# Patient Record
Sex: FEMALE | Race: WHITE | NOT HISPANIC OR LATINO | Employment: FULL TIME | URBAN - METROPOLITAN AREA
[De-identification: names, ages, dates, MRNs, and addresses within clinical notes are randomized per-mention and may not be internally consistent; named-entity substitution may affect disease eponyms.]

---

## 2021-03-03 ENCOUNTER — OFFICE VISIT (OUTPATIENT)
Dept: DERMATOLOGY | Facility: CLINIC | Age: 46
End: 2021-03-03
Payer: COMMERCIAL

## 2021-03-03 VITALS — TEMPERATURE: 98.2 F | WEIGHT: 115 LBS | BODY MASS INDEX: 21.16 KG/M2 | HEIGHT: 62 IN

## 2021-03-03 DIAGNOSIS — L71.9 ROSACEA: Primary | ICD-10-CM

## 2021-03-03 DIAGNOSIS — L66.1 LICHEN PLANO-PILARIS: ICD-10-CM

## 2021-03-03 PROCEDURE — 99204 OFFICE O/P NEW MOD 45 MIN: CPT | Performed by: STUDENT IN AN ORGANIZED HEALTH CARE EDUCATION/TRAINING PROGRAM

## 2021-03-03 RX ORDER — TRAZODONE HYDROCHLORIDE 50 MG/1
1 TABLET ORAL AS NEEDED
COMMUNITY
Start: 2021-01-28

## 2021-03-03 RX ORDER — HYDROXYCHLOROQUINE SULFATE 200 MG/1
TABLET, FILM COATED ORAL
Qty: 30 TABLET | Refills: 5 | Status: SHIPPED | OUTPATIENT
Start: 2021-03-03 | End: 2022-03-03

## 2021-03-03 RX ORDER — DEXTROAMPHETAMINE SACCHARATE, AMPHETAMINE ASPARTATE, DEXTROAMPHETAMINE SULFATE AND AMPHETAMINE SULFATE 2.5; 2.5; 2.5; 2.5 MG/1; MG/1; MG/1; MG/1
10 TABLET ORAL
COMMUNITY
Start: 2021-02-11 | End: 2021-05-12

## 2021-03-03 RX ORDER — TACROLIMUS 1 MG/G
OINTMENT TOPICAL 2 TIMES DAILY
Qty: 100 G | Refills: 3 | Status: SHIPPED | OUTPATIENT
Start: 2021-03-03

## 2021-03-03 RX ORDER — DOXYCYCLINE HYCLATE 100 MG/1
100 CAPSULE ORAL EVERY 12 HOURS SCHEDULED
Qty: 60 CAPSULE | Refills: 2 | Status: SHIPPED | OUTPATIENT
Start: 2021-03-03 | End: 2021-05-25

## 2021-03-03 NOTE — PATIENT INSTRUCTIONS
1  ROSACEA (POSSIBLE COMPONENT OF OCULAR)    Assessment and Plan:  Based on a thorough discussion of this condition and the management approach to it (including a comprehensive discussion of the known risks, side effects and potential benefits of treatment), the patient (family) agrees to implement the following specific plan:   Apply sun screen daily SPF 30 or higher   Please continue to use Metronidazole 0 75% cream   Please start Doxycycline 100 mg twice daily for 3 months    Please use Protopic 0 1% ointment twice daily as needed    Please start Plaquenil 200 mg take 1 tablet twice daily-Please continue routine eye exams  Rosacea is a chronic rash affecting the mid-face including the nose, cheeks, chin, forehead, and eyelids  The incidence is usually greatest between the ages of 30-60 years and is more common in people with fair skin  Common characteristics include redness, telangiectasias, papules and pustules over affected areas  Rosacea may look similar to acne, but there is a lack of comedones  Occasionally the eyes may also be involved in ocular rosacea  In advanced disease, enlargement of the sebaceous glands in the nose, termed rhinophyma, may be present  Rosacea results in red spots (papules) and sometimes pustules over the face, but unlike acne there are no blackheads, whiteheads, or cystic nodules  Patients often experience increased facial flushing with prominent blood vessels (erythematotelangiectatic rosacea) and dry, sensitive skin  These symptoms are exacerbated by sun exposure, hot or spicy foods, topical steroids and oil-based facial products  In ocular rosacea, eyelids may be red and sore due to conjunctivitis, keratitis, and episcleritis  If rhinophyma develops due to enlargement of sebaceous glands, the patient may have an enlarged and irregularly shaped nose with prominent pores   In rosacea that is refractory to treatment, patients can develop persistent redness and swelling of the face due to lymphatic obstruction (Morbihan disease)  Distribution around the cheeks may be confused with the malar or butterfly rash of lupus  However, the rash of lupus spares the nasal creases and lacks papules and pustules  If signs of photosensitivity, oral ulcers, arthritis, and kidney dysfunction are present then consider referral to a rheumatologist      There are many potential causes of rosacea including genetic, environmental, vascular, and inflammatory factors   These include, but are not limited to:   Chronic exposure to ultraviolet radiation    Increased immune responses in the form of cathelicidins that promote vessel dilation and infiltration with white blood cells (neutrophils) into the dermis   Increased matrix metalloproteinases such as collagen and elastase that remodel normal tissue may contribute to inflammation of the skin making it thicker and harder   There is some evidence to suggest that increased numbers of demodex mites on patient skin may contribute to rosacea papules     General Treatment Approach    Avoid exacerbating factors such as heat, spicy foods, and alcohol    Use daily SPF30+ sunscreen and other methods of coverage for sun protection   Use water-based make-up    Avoid applying topical steroids to affected areas as they can cause perioral dermatitis and exacerbate rosacea     Topical Treatment Approach   Metronidazole cream or gel by itself or in combination with oral antibiotics for more severe cases   Azelaic acid cream or lotion is effective for mild inflammatory rosacea when applied twice daily to affected areas   Brimonidine gel and oxymetazoline hydrochloride cream can reduce facial redness temporarily    Ivermectin cream can treat papulopustular rosacea by controlling demodex mites and inflammation    Pimecrolimus cream or tacrolimus ointment twice a day for 2-3 months can help reduce inflammation    Oral Treatment Approach   Antibiotics such as doxycycline, minocycline, or erythromycin for 1-3 months   Clonidine and carvedilol can help reduce facial flushing and are generally well tolerated  Common side effects include low blood pressure, gastrointestinal upset, dry eyes, blurred vision and low heart rate   Isotretinoin at low doses can be effective for long term treatment when antibiotics fail  Side effects may make it unsuitable for some patients   NSAIDs such as diclofenac can help reduce discomfort and redness in the skin  Procedural/Surgical Treatment Approach    Vascular lasers or intense pulsed light treatment may be used to treat persistent telangiectasia and papulopustular rosacea   Plastic surgery and carbon dioxide lasers may be used to treat rhinophyma     2   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

## 2021-03-03 NOTE — PROGRESS NOTES
[facility] Note     Patient Name: Xochitl Snowden  Encounter Date: 03/03/2021     Have you been cared for by a [facility] in the last 3 years and, if so, which one? No    · Have you traveled outside of the [address] [place] [address] in the past 3 months or outside of the [facility] area in the last 2 weeks? No     May we call your Preferred Phone number to discuss your specific medical information? Yes     May we leave a detailed message that includes your specific medical information? Yes      Today's Chief Concerns:   Concern #1:  Rosacea     Past Medical History:  Have you personally ever had or currently have any of the following? · Skin cancer (such as Melanoma, Basal Cell Carcinoma, Squamous Cell Carcinoma? (If Yes, please provide more detail)- No  · Eczema: No  · Psoriasis: No  · HIV/AIDS: No  · Hepatitis B or C: No  · Tuberculosis: No  · Systemic Immunosuppression such as Diabetes, Biologic or Immunotherapy, Chemotherapy, Organ Transplantation, Bone Marrow Transplantation (If YES, please provide more detail): No  · Radiation Treatment (If YES, please provide more detail): No  · Any other major medical conditions/concerns? 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 Golf    Family History:  Have any of your "first degree relatives" (parent, brother, sister, or child) had any of the following       · Skin cancer such as Melanoma or Merkel Cell Carcinoma or Pancreatic Cancer? No  · Eczema, Asthma, Hay Fever or Seasonal Allergies: YES, Eczema   · Psoriasis or Psoriatic Arthritis: No  · Do any other medical conditions seem to run in your family? If Yes, what condition and which relatives?   No    Current Medications:         Current Outpatient Medications:     amphetamine-dextroamphetamine (ADDERALL) 10 mg tablet, Take 10 mg by mouth, Disp: , Rfl:     traZODone (DESYREL) 50 mg tablet, Take 1 tablet by mouth as needed, Disp: , Rfl:       Review of Systems:  Have you recently had or currently have any of the following? If YES, what are you doing for the problem? · Fever, chills or unintended weight loss: YES, Weight loss  · Sudden loss or change in your vision: No  · Nausea, vomiting or blood in your stool: No  · Painful or swollen joints: NO  · Wheezing or cough: No  · Changing mole or non-healing wound: No  · Nosebleeds: No  · Excessive sweating: No  · Easy or prolonged bleeding? No  · Over the last 2 weeks, how often have you been bothered by the following problems? · Taking little interest or pleasure in doing things: 1 - Not at All  · Feeling down, depressed, or hopeless: 1 - Not at All  · Rapid heartbeat with epinephrine:  No    · FEMALES ONLY:    · Are you pregnant or planning to become pregnant? No  · Are you currently or planning to be nursing or breast feeding? No    · Any known allergies? · No Known Allergies      Physical Exam:     Was a chaperone (Derm Clinical Assistant) present throughout the entire Physical Exam? Yes     Did the Dermatology Team specifically  the patient on the importance of a Full Skin Exam to be sure that nothing is missed clinically?  Yes}  o Did the patient ultimately request or accept a Full Skin Exam?  NO    CONSTITUTIONAL:   Vitals:    03/03/21 0800   Temp: 98 2 °F (36 8 °C)   TempSrc: Tympanic   Weight: 52 2 kg (115 lb)   Height: 5' 2" (1 575 m)         PSYCH: Normal mood and affect  EYES: Normal conjunctiva  ENT: Normal lips and oral mucosa  CARDIOVASCULAR: No edema  RESPIRATORY: Normal respirations  HEME/LYMPH/IMMUNO:  No ostensible subQ swelling y except as noted below in "ASSESSMENT AND PLAN BY DIAGNOSIS"    SKIN:  FULL ORGAN SYSTEM EXAM  Hair, Scalp,  Face Normal except as noted below in Assessment   Neck, Cervical Chain Nodes Normal except as noted below in Assessment                                        Assessment and Plan by Diagnosis:    History of Present Condition:     Duration:  How long has this been an issue for you?    o  20+ years   Location Affected:  Where on the body is this affecting you?    o  Scalp and face   Quality:  Is there any bleeding, pain, itch, burning/irritation, or redness associated with the skin lesion? o  redness   Severity:  Describe any bleeding, pain, itch, burning/irritation, or redness on a scale of 1 to 10 (with 10 being the worst)  o  10   Timing:  Does this condition seem to be there pretty constantly or do you notice it more at specific times throughout the day?    o  Constant   Context:  Have you ever noticed that this condition seems to be associated with specific activities you do?    o  Denies   Modifying Factors:    o Anything that seems to make the condition worse?    -  Denies  o What have you tried to do to make the condition better? -  Metronidazole 0 75% Cream    Associated Signs and Symptoms:  Does this skin lesion seem to be associated with any of the following:  o  SL AMB DERM SIGNS AND SYMPTOMS: Redness     1  ROSACEA (POSSIBLE COMPONENT OF OCULAR ROSACEA)    Physical Exam:   Anatomic Location Affected: Face   Morphological Description:  Slight redness on face    Pertinent Positives: N/A   Pertinent Negatives: N/A    Additional History of Present Condition: Located on face and front of scalp  Presents for 20+ years  Patient has tried Metronidazole 0 75% cream with no improvement  Assessment and Plan:  Based on a thorough discussion of this condition and the management approach to it (including a comprehensive discussion of the known risks, side effects and potential benefits of treatment), the patient (family) agrees to implement the following specific plan:   Apply sun screen daily SPF 30 or higher   Please continue to use Metronidazole 0 75% cream   Please start Doxycycline 100 mg twice daily with food for 3 months   CANNOT GET PREGNANT   Please use Protopic 0 1% ointment twice daily as needed   Please see ophthalmologist regarding eye exam for rosacea  Pt has appt relatively soon      Follow up in 3 months     Rosacea is a chronic rash affecting the mid-face including the nose, cheeks, chin, forehead, and eyelids  The incidence is usually greatest between the ages of 30-60 years and is more common in people with fair skin  Common characteristics include redness, telangiectasias, papules and pustules over affected areas  Rosacea may look similar to acne, but there is a lack of comedones  Occasionally the eyes may also be involved in ocular rosacea  In advanced disease, enlargement of the sebaceous glands in the nose, termed rhinophyma, may be present  Rosacea results in red spots (papules) and sometimes pustules over the face, but unlike acne there are no blackheads, whiteheads, or cystic nodules  Patients often experience increased facial flushing with prominent blood vessels (erythematotelangiectatic rosacea) and dry, sensitive skin  These symptoms are exacerbated by sun exposure, hot or spicy foods, topical steroids and oil-based facial products  In ocular rosacea, eyelids may be red and sore due to conjunctivitis, keratitis, and episcleritis  If rhinophyma develops due to enlargement of sebaceous glands, the patient may have an enlarged and irregularly shaped nose with prominent pores  In rosacea that is refractory to treatment, patients can develop persistent redness and swelling of the face due to lymphatic obstruction (Morbihan disease)  Distribution around the cheeks may be confused with the malar or butterfly rash of lupus  However, the rash of lupus spares the nasal creases and lacks papules and pustules   If signs of photosensitivity, oral ulcers, arthritis, and kidney dysfunction are present then consider referral to a rheumatologist      There are many potential causes of rosacea including genetic, environmental, vascular, and inflammatory factors  These include, but are not limited to:   Chronic exposure to ultraviolet radiation    Increased immune responses in the form of cathelicidins that promote vessel dilation and infiltration with white blood cells (neutrophils) into the dermis   Increased matrix metalloproteinases such as collagen and elastase that remodel normal tissue may contribute to inflammation of the skin making it thicker and harder   There is some evidence to suggest that increased numbers of demodex mites on patient skin may contribute to rosacea papules     General Treatment Approach    Avoid exacerbating factors such as heat, spicy foods, and alcohol    Use daily SPF30+ sunscreen and other methods of coverage for sun protection   Use water-based make-up    Avoid applying topical steroids to affected areas as they can cause perioral dermatitis and exacerbate rosacea     Topical Treatment Approach   Metronidazole cream or gel by itself or in combination with oral antibiotics for more severe cases   Azelaic acid cream or lotion is effective for mild inflammatory rosacea when applied twice daily to affected areas   Brimonidine gel and oxymetazoline hydrochloride cream can reduce facial redness temporarily    Ivermectin cream can treat papulopustular rosacea by controlling demodex mites and inflammation    Pimecrolimus cream or tacrolimus ointment twice a day for 2-3 months can help reduce inflammation    Oral Treatment Approach   Antibiotics such as doxycycline, minocycline, or erythromycin for 1-3 months   Clonidine and carvedilol can help reduce facial flushing and are generally well tolerated  Common side effects include low blood pressure, gastrointestinal upset, dry eyes, blurred vision and low heart rate   Isotretinoin at low doses can be effective for long term treatment when antibiotics fail  Side effects may make it unsuitable for some patients      NSAIDs such as diclofenac can help reduce discomfort and redness in the skin  Procedural/Surgical Treatment Approach    Vascular lasers or intense pulsed light treatment may be used to treat persistent telangiectasia and papulopustular rosacea   Plastic surgery and carbon dioxide lasers may be used to treat rhinophyma     2  LIKELY LICHEN PLANO-PILARIS    Physical Exam:   Anatomic Location Affected:  Scalp and eye brows   Morphological Description:  Loss of hair on eyebrows, frontal scalp with follicles with surrounding erythema and slight scarring   Pertinent Positives: N/A   Pertinent Negatives: N/A    Additional History of Present Condition:  Patient states she has noticed hair loss in the front of her scalp to her temples       Assessment and Plan:  Based on a thorough discussion of this condition and the management approach to it (including a comprehensive discussion of the known risks, side effects and potential benefits of treatment), the patient (family) agrees to implement the following specific plan:  · Please use Protopic 0 1% ointment twice daily on scalp   · Please start Plaquenil 200 mg take 1 tablet twice daily-Please continue routine eye exams and inform doctor on plaquenil        Scribe Attestation    I,:  Georgina Godoy am acting as a scribe while in the presence of the attending physician :       I,:  Semaj Bravo MD personally performed the services described in this documentation    as scribed in my presence :

## 2021-03-13 ENCOUNTER — PATIENT MESSAGE (OUTPATIENT)
Dept: DERMATOLOGY | Facility: CLINIC | Age: 46
End: 2021-03-13

## 2021-03-16 ENCOUNTER — TELEPHONE (OUTPATIENT)
Dept: DERMATOLOGY | Facility: CLINIC | Age: 46
End: 2021-03-16

## 2021-03-16 NOTE — TELEPHONE ENCOUNTER
Regarding: RE: Prescription Question  Contact: [phone]  ----- Message from Tessy Sosa MD sent at 3/16/2021 10:34 AM EDT -----       ----- Message sent from Tessy Sosa MD to Domingo Harris at 3/16/2021 10:34 AM -----   Dear Dwayne Petit,    I am sorry to hear that you did not receive protopic (tacrolimus) ointment  I do not believe the pharmacy ever notified us  I will ask my staff right away to contact the pharmacy  With regards to facial cleansers, creams, sunscreens, etc ,  I would recommend a gentle face wash given your rosacea (Cetaphial facial cleanser works well)  In terms of creams for moistiurization, Cetaphil cream works well but others are fine too  In terms of sunscreens, there is insufficient evidence at this time to link sunscreen with FFA  Some people think there is a link but it has yet to be definitely proven  If I were you, I would avoid applying sunscreen near your hairline until more data is out  In terms of which sunscreen, any sunscreen that is SPF50 or higher is fine, but again, avoid hairline  Warm regards,  Tessy Sosa      ----- Message -----       From:Ani Wilder       Sent:3/13/2021  8:26 AM EST         To:Allyson Holley MD    Subject:Prescription Question    Good morning,    The topical cream you prescribed is still not cleared by my insurance company  If you could please prescribe something else or check on the status, I would greatly appreciate it  My entire face, eyes and scalp were on fire yesterday and I could have really used it  Do you have a prescription face care regimen you could prescribe? I get conflicting information on if to exfoliate or not to, use wash cloth or not to, certain ingredients to avoid etc  Derek Quinones And I believe that would help tremendously if I have the right information and products to use  If there is a prescribed sunscreen, hair products (for FFA) you recommend that would be great as well      Thank you for helping me with all these issues  Enjoy your weekend!   Jennifer

## 2021-03-16 NOTE — TELEPHONE ENCOUNTER
I am unsure why our office never received anything stating the patient needed a prior authorization for Protopic  I spoke with Chapo Kaur from [facility] and he states that the medication does need a prior authorization  I asked why our office was not notified and he said he was unsure and that I had to call [employer]  I spoke with Krish Mckee at [employer] and she also states that the patient needs a prior authorization  She gave me a direct number to being the prior authorization over the phone at [phone] or [phone]  I did inform the patient that I will be calling to initiate prior authorization today  Patient was thankful with no further concerns and is aware I will keep her updated through the process

## 2021-03-16 NOTE — TELEPHONE ENCOUNTER
Gloria: can you find out from pharmacy why there is a delay for insurance to cover protopic (tacrolimus) ointment? Does a PA need to be filled or does the insurance prefer some other ointment? The patient sent me a message and said she still cannot get protopic ointment  Thank you

## 2021-03-17 NOTE — TELEPHONE ENCOUNTER
I spoke with [facility] HAMLET from [facility] at [phone] to begin prior authorization for Tacrolimus 0 1% ointment  Case# [id number]  She states that there is a 2 business day turn around time

## 2021-03-19 NOTE — TELEPHONE ENCOUNTER
Spoke with Sav Marquez and he states that the case is still under clinical review  There should be a determination made by today on the patient's medication

## 2021-03-24 NOTE — TELEPHONE ENCOUNTER
I do apologize unsure who scanned in denial letter but we are missing a page  I did call insurance clinical care team directly and spoke with Jacey Quiles and that states that you have to call [phone] to schedule a peer to peer  I did try to call the patient to inform her of denial and that we are still working on it  However I was unable to leave a message as her mailbox was full

## 2021-03-24 NOTE — TELEPHONE ENCOUNTER
Gloria: In the [address] rejection, the last couple of lines, it says something like " if you want to appeal the decision    please follow the steps outlined in the atttachment " However, I do not see any additional pages  I want to appeal the decision and call the insurance company to do [id number]  If there are additional pages, can you scan them in? Thamks!

## 2021-03-30 NOTE — TELEPHONE ENCOUNTER
Gloria: I spoke and did peer to peer, and they approved medication  patient should be able to  pharmacy  Thank you

## 2021-03-31 NOTE — TELEPHONE ENCOUNTER
Spoke with the patient and she is aware that her tacrolimus has been approved  The patient has no further questions or concerns at this time

## 2021-04-01 ENCOUNTER — OFFICE VISIT (OUTPATIENT)
Dept: URGENT CARE | Facility: CLINIC | Age: 46
End: 2021-04-01
Payer: COMMERCIAL

## 2021-04-01 VITALS
SYSTOLIC BLOOD PRESSURE: 138 MMHG | OXYGEN SATURATION: 99 % | RESPIRATION RATE: 16 BRPM | TEMPERATURE: 97.6 F | HEART RATE: 78 BPM | DIASTOLIC BLOOD PRESSURE: 80 MMHG

## 2021-04-01 DIAGNOSIS — H57.12 LEFT EYE PAIN: Primary | ICD-10-CM

## 2021-04-01 PROCEDURE — 99213 OFFICE O/P EST LOW 20 MIN: CPT | Performed by: PHYSICIAN ASSISTANT

## 2021-04-01 RX ORDER — TETRACAINE HYDROCHLORIDE 5 MG/ML
1 SOLUTION OPHTHALMIC ONCE
Status: COMPLETED | OUTPATIENT
Start: 2021-04-01 | End: 2021-04-01

## 2021-04-01 RX ADMIN — TETRACAINE HYDROCHLORIDE 1 DROP: 5 SOLUTION OPHTHALMIC at 08:47

## 2021-04-01 NOTE — PROGRESS NOTES
[facility]        NAME: Mohinder Rosas is a 39 y o  female  : 1975    MRN: [id number]  DATE: 2021  TIME: 9:17 AM    Assessment and Plan   Left eye pain [H57 12]  1  Left eye pain  tetracaine 0 5 % ophthalmic solution 1 drop    fluorescein sodium sterile 1 mg ophthalmic strip 1 strip     Made an appt for pt to see eye doctor at 10:45 today    Patient Instructions   Patient Instructions   [facility]   [id number] [address]  [facility]    10:45 am   [address] [facility], [facility] [address] [facility] [address]         Follow up with PCP in 3-5 days  Proceed to  ER if symptoms worsen  Chief Complaint     Chief Complaint   Patient presents with    Eye Pain     left eye pain  began one week ago  sharp and constant, gritty         History of Present Illness       The patient is a 61-year-old female with a  Hx of Rosacea and blepharitis presenting with left eye pain x1 week  She describes the pain as a sharp constant pain with a gritty sensation  She states that she has had this before but normally the pain will resolve  It has never lasted this long  She states that she does have some issues with her eyelashes and actually had to pull some out last night to help with the pain  She denies any blurry vision, pain with eye movements or trouble seeing  Review of Systems   Review of Systems   Constitutional: Negative for activity change, appetite change, chills, fatigue and fever  HENT: Negative for congestion, rhinorrhea, sinus pressure, sinus pain and sore throat  Eyes: Positive for pain (Sharp pain and feels gritty)  Negative for photophobia, discharge, redness, itching and visual disturbance  Respiratory: Negative for cough, chest tightness and shortness of breath  Cardiovascular: Negative for chest pain and palpitations  Gastrointestinal: Negative for diarrhea, nausea and vomiting  Musculoskeletal: Negative for arthralgias and myalgias  Skin: Negative for color change and pallor     Neurological: Negative for headaches  Current Medications       Current Outpatient Medications:     amphetamine-dextroamphetamine (ADDERALL) 10 mg tablet, Take 10 mg by mouth, Disp: , Rfl:     doxycycline hyclate (VIBRAMYCIN) 100 mg capsule, Take 1 capsule (100 mg total) by mouth every 12 (twelve) hours Take with food and water  CANNOT GET PREGNANT WHILE ON MEDICATION , Disp: 60 capsule, Rfl: 2    hydroxychloroquine (PLAQUENIL) 200 mg tablet, Take 1 tablet twice daily  , Disp: 30 tablet, Rfl: 5    tacrolimus (PROTOPIC) 0 1 % ointment, Apply topically 2 (two) times a day Apply to scalp 2x/day  Apply to itchy or red areas of face as needed  , Disp: 100 g, Rfl: 3    traZODone (DESYREL) 50 mg tablet, Take 1 tablet by mouth as needed, Disp: , Rfl:   No current facility-administered medications for this visit  Current Allergies     Allergies as of 04/01/2021    (No Known Allergies)            The following portions of the patient's history were reviewed and updated as appropriate: allergies, current medications, past family history, past medical history, past social history, past surgical history and problem list      No past medical history on file  No past surgical history on file  Family History   Problem Relation Age of Onset    No Known Problems Mother     No Known Problems Father          Medications have been verified  Objective   /80   Pulse 78   Temp 97 6 °F (36 4 °C)   Resp 16   SpO2 99%          Physical Exam     Physical Exam  Vitals signs reviewed  Constitutional:       General: She is not in acute distress  Appearance: Normal appearance  She is normal weight  She is not ill-appearing, toxic-appearing or diaphoretic  HENT:      Head: Normocephalic and atraumatic  Eyes:      General: No scleral icterus  Right eye: No discharge  Left eye: No discharge  Extraocular Movements: Extraocular movements intact        Conjunctiva/sclera: Conjunctivae normal  Pupils: Pupils are equal, round, and reactive to light  Cardiovascular:      Rate and Rhythm: Normal rate and regular rhythm  Heart sounds: Normal heart sounds  No murmur  No friction rub  No gallop  Pulmonary:      Effort: Pulmonary effort is normal  No respiratory distress  Breath sounds: Normal breath sounds  No stridor  No wheezing, rhonchi or rales  Chest:      Chest wall: No tenderness  Musculoskeletal: Normal range of motion  Skin:     General: Skin is warm and dry  Capillary Refill: Capillary refill takes less than 2 seconds  Neurological:      Mental Status: She is alert  Universal Protocol:  Consent: Verbal consent obtained  Consent given by: patient  Patient understanding: patient states understanding of the procedure being performed  Patient identity confirmed: verbally with patient    Foreign body removal    Date/Time: 4/1/2021 9:07 AM  Performed by: Jaimee Becerril PA-C  Authorized by: Jaimee Becerril PA-C   Body area: eye  Location details: left conjunctiva    Anesthesia:  Local Anesthetic: tetracaine drops  Anesthetic total (drops): 2  Sedation:  Patient sedated: no  Patient restrained: no  Comments: Eye stained and visualized under woods lamp  No areas of uptake  No hordeolum seen

## 2021-05-23 DIAGNOSIS — L71.9 ROSACEA: ICD-10-CM

## 2021-05-23 DIAGNOSIS — L66.1 LICHEN PLANO-PILARIS: ICD-10-CM

## 2021-05-25 RX ORDER — DOXYCYCLINE HYCLATE 100 MG/1
CAPSULE ORAL
Qty: 60 CAPSULE | Refills: 2 | Status: SHIPPED | OUTPATIENT
Start: 2021-05-25 | End: 2021-06-24